# Patient Record
(demographics unavailable — no encounter records)

---

## 2024-12-04 NOTE — PHYSICAL EXAM
[Normocephalic] : normocephalic [EOMI] : extra ocular movement intact [Supple] : supple [No Supraclavicular Adenopathy] : no supraclavicular adenopathy [No Cervical Adenopathy] : no cervical adenopathy [de-identified] : Bilateral chest wall/axilla/supraclavicular area-no evidence of recurrence

## 2024-12-04 NOTE — HISTORY OF PRESENT ILLNESS
[FreeTextEntry1] : Patient is a 63yo F who presents today for breast cancer surveillance. S/p B/l TM (no SLNB) w/ Dr. 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  Fhx of breast cancer in maternal cousin (age 55-59, no known genetics).  Patient met w/ Dr. Hines 5/2024 who did not recommend adjuvant therapy. Paternal aunt with possible ovarian or endometrial cancer. Patient is BRCA/full panel negative (2024). Patient denies palpable masses or skin changes bilaterally. Of note, patient w/ h/o pediatric cardiac surgery, HTN, and DM.   TNM Staging: pTis, pN0  9/26/23: B/l MG ([facility])- scattered fibroglandular. R stable tiny calcs UOQ anterior. R developing tiny calcs outer central to slightly upper 5-6FN (rec dxMG). BI-RADS 0 11/15/23: R MG ([facility])- scattered fibroglandular. R grouped amorphous calcs UOQ (rec stereo bx). R probably benign additional round/punctate and coarse calcs UOQ (rec 6m f/u). BI-RADS 4 12/14/23: R stereo bx calcs UOQ (top hat clip)- DCIS (cribriform, papillary, and micropapillary patterns w/ intermediate nuclear grade). ER+ (>95%), OH+ (40%). Concordant- rec stereo bx of second group of calcs posterior UOQ previously described as probably benign. Note- no residual calcs seen post bx, though may be obscured by bx site changes 1/18/24: R stereo bx calcs UOQ posterior (hourglass clip)- DCIS (extends into lobules, cribriform and micropapillary patterns, intermediate and high nuclear grade w/ central necrosis), microcalcs assoc w/ DCIS and benign epithelium. Malignant & Concordant. The 2 sites of DCIS as indicated by clips and original calcs spans 5.5 cm. Note- site of DCIS and current site are 6.8 cm apart. 2/15/24: MRI- heterogeneously dense, L 1.7 cm NME upper outer anterior (rec L MR bx), R 9.7 cm clumped linear NME RA upper outer extending into the nipple c/w known DCIS. No evidence of skin, or chest wall involvement. No axillary or internal mammary adenopathy is present bilaterally. 3/7/24: L MR bx NME upper outer anterior (cork clip)- DCIS (intermediate grade, cribriform type) assoc w/ necrosis and calcs and involves IDP. ER+ (95%),OH+(60%). Malignant & Concordant. 5/17/24: B/l TM (no SLNB)- R TM w/ at least 2cm of DCIS, negative marging. L TM w/ at least 0.9cm DCIS. L 0/1 LN.

## 2024-12-04 NOTE — PAST MEDICAL HISTORY
[Postmenopausal] : The patient is postmenopausal [Menarche Age ____] : age at menarche was [unfilled] [Menopause Age____] : age at menopause was [unfilled] [Total Preg ___] : G[unfilled] [History of Hormone Replacement Treatment] : has no history of hormone replacement treatment [FreeTextEntry6] : no [FreeTextEntry7] : no [FreeTextEntry8] : n/a

## 2024-12-04 NOTE — PHYSICAL EXAM
[Normocephalic] : normocephalic [EOMI] : extra ocular movement intact [Supple] : supple [No Supraclavicular Adenopathy] : no supraclavicular adenopathy [No Cervical Adenopathy] : no cervical adenopathy [de-identified] : Bilateral chest wall/axilla/supraclavicular area-no evidence of recurrence

## 2024-12-04 NOTE — HISTORY OF PRESENT ILLNESS
[FreeTextEntry1] : Patient is a 61yo F who presents today for breast cancer surveillance. S/p B/l TM (no SLNB) w/ Dr. Allen 5/17/24 for B/l DCIS (both ER/IA+). 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  Fhx of breast cancer in maternal cousin (age 55-59, no known genetics).  Patient met w/ Dr. Hines 5/2024 who did not recommend adjuvant therapy. Paternal aunt with possible ovarian or endometrial cancer. Patient is BRCA/full panel negative (2024). Patient denies palpable masses or skin changes bilaterally. Of note, patient w/ h/o pediatric cardiac surgery, HTN, and DM.   TNM Staging: pTis, pN0  9/26/23: B/l MG ([facility])- scattered fibroglandular. R stable tiny calcs UOQ anterior. R developing tiny calcs outer central to slightly upper 5-6FN (rec dxMG). BI-RADS 0 11/15/23: R MG ([facility])- scattered fibroglandular. R grouped amorphous calcs UOQ (rec stereo bx). R probably benign additional round/punctate and coarse calcs UOQ (rec 6m f/u). BI-RADS 4 12/14/23: R stereo bx calcs UOQ (top hat clip)- DCIS (cribriform, papillary, and micropapillary patterns w/ intermediate nuclear grade). ER+ (>95%), IA+ (40%). Concordant- rec stereo bx of second group of calcs posterior UOQ previously described as probably benign. Note- no residual calcs seen post bx, though may be obscured by bx site changes 1/18/24: R stereo bx calcs UOQ posterior (hourglass clip)- DCIS (extends into lobules, cribriform and micropapillary patterns, intermediate and high nuclear grade w/ central necrosis), microcalcs assoc w/ DCIS and benign epithelium. Malignant & Concordant. The 2 sites of DCIS as indicated by clips and original calcs spans 5.5 cm. Note- site of DCIS and current site are 6.8 cm apart. 2/15/24: MRI- heterogeneously dense, L 1.7 cm NME upper outer anterior (rec L MR bx), R 9.7 cm clumped linear NME RA upper outer extending into the nipple c/w known DCIS. No evidence of skin, or chest wall involvement. No axillary or internal mammary adenopathy is present bilaterally. 3/7/24: L MR bx NME upper outer anterior (cork clip)- DCIS (intermediate grade, cribriform type) assoc w/ necrosis and calcs and involves IDP. ER+ (95%),IA+(60%). Malignant & Concordant. 5/17/24: B/l TM (no SLNB)- R TM w/ at least 2cm of DCIS, negative marging. L TM w/ at least 0.9cm DCIS. L 0/1 LN.

## 2024-12-18 NOTE — PHYSICAL EXAM
[FreeTextEntry1] : Gen: Patient is A&O x 3, NAD HEENT: EOMI, hearing grossly normal Resp: regular, non-labored Abd: No visible distension   Spine:   Inspection: Protracted shoulders. increased thoracic kyphosis. No periscapular atrophy. No scapulothoracic dyskinesia or winging.   Palpation: No tenderness to palpation of midline structures; TTP to X   ROM: full and pain-free. No pain with oblique extension or transitional movements   (-) Spurling sign, (-) Gandhi's sign,   Breast: acquired absence of bilateral breasts. No axillary cording appreciated, incision healed. +fibrotic tissue texture changes, No palpable nodules. TTP and hypersensitive to touch along chest wall.   Lymph: no clubbing, cyanosis or edema. Negative Stemmer's sign   Extremities:    Inspection: Normal bulk with no evidence of atrophy of extremities    ROM: Left: impaired shoulder abduction (80), flexion (90), ER (60), IR (L5); RIGHT: Impaired shoulder flexion (160), abduction (110)    Palpation: TTP to GHJ and superior aspect of left shoulder and surrounding musculature    Special tests:      Shoulder: LEFT: (+) Neer's, (+) Hawkin's, (+) Empty Can, (+) Speed's; (-) Upper limb (brachial plexus) tension test   Neuro:   Sensation: grossly intact to light touch   Reflexes: Gandhi's negative   Strength:          LEFT UE - ShAB 5/5, EF 5/5, EE 5/5, WE 5/5, intrinsic 5/5, long finger flexors 5/5         RIGHT UE - ShAB 5/5, EF 5/5, EE 5/5, WE 5/5, intrinsic 5/5, long finger flexors 5/5   Functional:   Gait: normal step length, non-antalgic, well-compensated

## 2024-12-18 NOTE — HISTORY OF PRESENT ILLNESS
[FreeTextEntry1] : Ms. ANA TINOCO is a 62-year-old female with history of bilateral breast cancer who presents for initial evaluation for impaired upper extremity mobility and breast/ chest wall pain.   Oncologic Hx: - S/p B/l TM (no SLNB) w/ Dr. Allen 24 for B/l DCIS (both ER/CA+). 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 - No chemo / RT/ hormone therapy   Pertinent PMHx: HTN, aortic stenosis, DM2 --------------------------------------------------- 2024 -- Initial Evaluation: Presents today accompanied by her partner, Maddy  *Pain  > Location: bilateral L>R chest wall, Left shoulder with radiation to upper arm, occasionally with pain radiating down forearm. No neck pain.   > Onset: 6 months. Did well a few months post op, then developed ongoing tightness and pain in breast with difficulty moving left arm  > Provocation/Palliative:   > Quality: constant, throbbing, achy, hypersensitive (around breast area)  > Radiation: to left upper arm, shoulder  > Severity:3-9/10  > Timing: constant, worse with movement of shoulder/ arm or palpation of breast  > Denies any associated numbness. Denies any associated leg weakness. Denies any loss of bowel/bladder control or any groin numbness.  > Previous medications trialed: Tylenol (moderate relief), Menthol ointment (moderate relief)  She has difficulty raising bother her arms arbove her heads L>R. She denies history of trauma and does not know if she has OA in her shoulders. No chemo/RT/ or hormone therapy history. Denies any weakness of her hand distally. No neck pain --------------------------------------------------- Functional Performance Status: - KPS: 90 - ECO - ADLs/ iADLs: Independent - Mobility: Independent ---------------------------------------------------

## 2024-12-18 NOTE — ASSESSMENT
[FreeTextEntry1] : Ms. ANA TINOCO is a 62-year-old female with history of bilateral breast cancer who presents for initial evaluation for impaired upper extremity mobility.   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 At risk given hx of DM2 and recent surgery.   Plan/ Recommendations: - I reviewed prior provider notes, and the relevant imaging mentioned above and discussed current symptoms, potential etiologies, and management to date, as well as anticipated rehab course. - Imaging/ Work-up:   > MRI bilateral shoulders to evaluate osseous and soft tissue structures.  - Referrals:    > requested referral for PCP - Therapy:   > Provided Rx for breast focused therapy with special attention to arm ROM. Referred to [facility].   > Reviewed home exercises and provided a handout - Modalities:   > Discussed warm compress to breast/ chest wall as needed for pain - Medications:   > Discussed role of neuropathic agents for managing post-mastectomy pain. Reviewed MOA of gabapentin and Cymbalta. She would like to defer for now.    > Continue Tylenol as needed for pain. Can alternate with OTC NSAID sparingly for shoulder pain. Monitor BP   > OK to use topical analgesic balm to painful areas. Can also trial lidocaine patch - DME: none - Bracing/ Garments:   > CLT to eval and measure for compression bra - Education/ Counseling:   > We discussed the importance of physical activity, ergonomics, and posture in the management of this condition and overall health benefits.   > Reviewed lifestyle modifications including balanced diet with attention to protein-rich foods. She will follow up with Che (nutrition) for more specifics - Exercise Rx: Reviewed ACSM guidelines for physical activity in cancer patients and provided handout.  - Follow-up: 4-6 weeks   The patient expressed verbal understanding and is in agreement with the plan of care. All of the patient's questions and concerns were addressed during today's visit.

## 2025-06-11 NOTE — PHYSICAL EXAM
[Normocephalic] : normocephalic [EOMI] : extra ocular movement intact [Supple] : supple [No Supraclavicular Adenopathy] : no supraclavicular adenopathy [No Cervical Adenopathy] : no cervical adenopathy [de-identified] : Bilateral chest wall/axilla/supraclavicular area-no evidence of recurrence

## 2025-06-11 NOTE — PHYSICAL EXAM
[Normocephalic] : normocephalic [EOMI] : extra ocular movement intact [Supple] : supple [No Supraclavicular Adenopathy] : no supraclavicular adenopathy [No Cervical Adenopathy] : no cervical adenopathy [de-identified] : Bilateral chest wall/axilla/supraclavicular area-no evidence of recurrence

## 2025-06-11 NOTE — HISTORY OF PRESENT ILLNESS
[FreeTextEntry1] : Patient is a 63yo F who presents today for breast cancer surveillance. S/p B/l TM (no SLNB) w/ Dr. Allen 5/17/24 for B/l DCIS (both ER/TX+). 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  Fhx of breast cancer in maternal cousin (age 55-59, no known genetics).  Patient met w/ Dr. Hines 5/2024 who did not recommend adjuvant therapy. Paternal aunt with possible ovarian or endometrial cancer. Patient is BRCA/full panel negative (2024). Patient denies palpable masses or skin changes bilaterally. Of note, patient w/ h/o pediatric cardiac surgery, HTN, and DM.  Of note, patient follows with PM&R for decreased ROM (Dr. Amin)  TNM Staging: pTis, pN0  9/26/23: B/l MG ([facility])- scattered fibroglandular. R stable tiny calcs UOQ anterior. R developing tiny calcs outer central to slightly upper 5-6FN (rec dxMG). BI-RADS 0 11/15/23: R MG ([facility])- scattered fibroglandular. R grouped amorphous calcs UOQ (rec stereo bx). R probably benign additional round/punctate and coarse calcs UOQ (rec 6m f/u). BI-RADS 4 12/14/23: R stereo bx calcs UOQ (top hat clip)- DCIS (cribriform, papillary, and micropapillary patterns w/ intermediate nuclear grade). ER+ (>95%), TX+ (40%). Concordant- rec stereo bx of second group of calcs posterior UOQ previously described as probably benign. Note- no residual calcs seen post bx, though may be obscured by bx site changes 1/18/24: R stereo bx calcs UOQ posterior (hourglass clip)- DCIS (extends into lobules, cribriform and micropapillary patterns, intermediate and high nuclear grade w/ central necrosis), microcalcs assoc w/ DCIS and benign epithelium. Malignant & Concordant. The 2 sites of DCIS as indicated by clips and original calcs spans 5.5 cm. Note- site of DCIS and current site are 6.8 cm apart. 2/15/24: MRI- heterogeneously dense, L 1.7 cm NME upper outer anterior (rec L MR bx), R 9.7 cm clumped linear NME RA upper outer extending into the nipple c/w known DCIS. No evidence of skin, or chest wall involvement. No axillary or internal mammary adenopathy is present bilaterally. 3/7/24: L MR bx NME upper outer anterior (cork clip)- DCIS (intermediate grade, cribriform type) assoc w/ necrosis and calcs and involves IDP. ER+ (95%),TX+(60%). Malignant & Concordant. 5/17/24: B/l TM (no SLNB)- R TM w/ at least 2cm of DCIS, negative margin. L TM w/ at least 0.9cm DCIS. L 0/1 LN. no reconstruction

## 2025-06-11 NOTE — HISTORY OF PRESENT ILLNESS
[FreeTextEntry1] : Patient is a 63yo F who presents today for breast cancer surveillance. S/p B/l TM (no SLNB) w/ Dr. Allen 5/17/24 for B/l DCIS (both ER/NM+). 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  Fhx of breast cancer in maternal cousin (age 55-59, no known genetics).  Patient met w/ Dr. Hines 5/2024 who did not recommend adjuvant therapy. Paternal aunt with possible ovarian or endometrial cancer. Patient is BRCA/full panel negative (2024). Patient denies palpable masses or skin changes bilaterally. Of note, patient w/ h/o pediatric cardiac surgery, HTN, and DM.  Of note, patient follows with PM&R for decreased ROM (Dr. Amin)  TNM Staging: pTis, pN0  9/26/23: B/l MG ([facility])- scattered fibroglandular. R stable tiny calcs UOQ anterior. R developing tiny calcs outer central to slightly upper 5-6FN (rec dxMG). BI-RADS 0 11/15/23: R MG ([facility])- scattered fibroglandular. R grouped amorphous calcs UOQ (rec stereo bx). R probably benign additional round/punctate and coarse calcs UOQ (rec 6m f/u). BI-RADS 4 12/14/23: R stereo bx calcs UOQ (top hat clip)- DCIS (cribriform, papillary, and micropapillary patterns w/ intermediate nuclear grade). ER+ (>95%), NM+ (40%). Concordant- rec stereo bx of second group of calcs posterior UOQ previously described as probably benign. Note- no residual calcs seen post bx, though may be obscured by bx site changes 1/18/24: R stereo bx calcs UOQ posterior (hourglass clip)- DCIS (extends into lobules, cribriform and micropapillary patterns, intermediate and high nuclear grade w/ central necrosis), microcalcs assoc w/ DCIS and benign epithelium. Malignant & Concordant. The 2 sites of DCIS as indicated by clips and original calcs spans 5.5 cm. Note- site of DCIS and current site are 6.8 cm apart. 2/15/24: MRI- heterogeneously dense, L 1.7 cm NME upper outer anterior (rec L MR bx), R 9.7 cm clumped linear NME RA upper outer extending into the nipple c/w known DCIS. No evidence of skin, or chest wall involvement. No axillary or internal mammary adenopathy is present bilaterally. 3/7/24: L MR bx NME upper outer anterior (cork clip)- DCIS (intermediate grade, cribriform type) assoc w/ necrosis and calcs and involves IDP. ER+ (95%),NM+(60%). Malignant & Concordant. 5/17/24: B/l TM (no SLNB)- R TM w/ at least 2cm of DCIS, negative margin. L TM w/ at least 0.9cm DCIS. L 0/1 LN. no reconstruction

## 2025-07-17 NOTE — CARDIOLOGY SUMMARY
[de-identified] : CONCLUSIONS  1. Negative stress echocardiogram for exercise induced ischemia or sustained arrhythmia. 2. The patient underwent stress testing using the Modified Armando protocol. _ The patient exercised for 3 min 43 sec. Test was stopped due to maximum heart rate achieved. _ The peak heart rate was 176 bpm: 111 % of predicted maximal heart rate for this patient. 3. No chest pain or shortness of breath. 4. No ischemic ST segment changes. 5. Sinus tachycardia with atrial premature contractions seen with exercise. 6. Baseline/resting valve assessment showed mild to moderate (mean transaortic gradient of 20.0 mmHg on 05/03/2024) aortic stenosis. 7. At stress, valve assessment revealed moderate (the mean transaortic gradient increased to 28.8 mmHg) aortic stenosis.

## 2025-07-17 NOTE — ASSESSMENT
[FreeTextEntry1] : 64 yo woman with ASD and VSD s/p ASD repair Echo 2024 AS, normal LV/RV function BP not at goal  - labs - c/w losartan, add HCTZ - echo to evaluate AS - discussed diet and exercise - follow up 2 mo

## 2025-07-17 NOTE — PHYSICAL EXAM
[Well Developed] : well developed [Well Nourished] : well nourished [No Acute Distress] : no acute distress [Normal S1, S2] : normal S1, S2 [Murmur] : murmur [Clear Lung Fields] : clear lung fields [Good Air Entry] : good air entry [No Respiratory Distress] : no respiratory distress  [No Edema] : no edema [No Cyanosis] : no cyanosis [No Clubbing] : no clubbing [de-identified] : SARAI